# Patient Record
Sex: FEMALE | Race: WHITE | NOT HISPANIC OR LATINO | URBAN - METROPOLITAN AREA
[De-identification: names, ages, dates, MRNs, and addresses within clinical notes are randomized per-mention and may not be internally consistent; named-entity substitution may affect disease eponyms.]

---

## 2017-11-12 ENCOUNTER — EMERGENCY (EMERGENCY)
Facility: HOSPITAL | Age: 27
LOS: 1 days | Discharge: HOME | End: 2017-11-12
Admitting: EMERGENCY MEDICINE
Payer: COMMERCIAL

## 2017-11-12 VITALS
DIASTOLIC BLOOD PRESSURE: 64 MMHG | OXYGEN SATURATION: 100 % | TEMPERATURE: 98 F | RESPIRATION RATE: 18 BRPM | HEART RATE: 100 BPM | SYSTOLIC BLOOD PRESSURE: 98 MMHG

## 2017-11-12 VITALS
RESPIRATION RATE: 18 BRPM | HEART RATE: 90 BPM | OXYGEN SATURATION: 99 % | DIASTOLIC BLOOD PRESSURE: 80 MMHG | SYSTOLIC BLOOD PRESSURE: 125 MMHG | TEMPERATURE: 98 F

## 2017-11-12 PROCEDURE — 70450 CT HEAD/BRAIN W/O DYE: CPT | Mod: 26

## 2017-11-12 PROCEDURE — 99284 EMERGENCY DEPT VISIT MOD MDM: CPT | Mod: 25

## 2017-11-12 RX ORDER — ACETAMINOPHEN 500 MG
975 TABLET ORAL ONCE
Qty: 0 | Refills: 0 | Status: COMPLETED | OUTPATIENT
Start: 2017-11-12 | End: 2017-11-12

## 2017-11-12 RX ADMIN — Medication 975 MILLIGRAM(S): at 05:15
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
## 2017-11-12 NOTE — ED ADULT NURSE NOTE - CAS TRG GEN SKIN CONDITION
Migraña   LO QUE NECESITA SABER:   [place] es un dolor de nicolás intenso  El dolor puede ser tan severo que interfiere con antonieta actividades cotidianas  Condon puede durar desde pocas horas hasta varios días  La causa exacta de la migraña no es conocida  INSTRUCCIONES SOBRE [facility]ITAL[facility]:   Regrese a la bonita de emergencias si:   · Usted tiene dolor de nicolás que parece ser diferente o mucho peor que quinn migraña habitual     · Usted tiene un dolor de nicolás severo con fiebre o rigidez en el tobin  · Usted tiene nuevos problemas con el habla, la visión, el equilibrio o el [address]  · Usted siente que se va a desmayar, se siente confundido o sufre boo convulsión  Comuníquese con quinn médico o neurólogo si:   · Quinn migraña interfiere con antonieta actividades cotidianas  · Antonieta medicamentos o tratamientos asher de funcionar  · Usted tiene preguntas o inquietudes acerca de quinn condición o cuidado  Medicamentos:  Usted podría  necesitar alguno de los siguientes  [place] medicamento tan pronto keaton sienta que le comienza [place]  · Un medicamento con receta para el dolor  podrían ser Kenard Raisa  No espere a que el dolor sea muy intenso para bruce el medicamento  · Medicamentos para la migraña  se [place] para evitar boo migraña o detenerla boo vez que comience  · Los medicamentos contra las náuseas  pueden darse para calmar quinn estómago y ayudarle a prevenir los vómitos  [place] medicamento también puede aliviar el dolor  · [place] antonieta medicamentos keaton se le haya indicado  Consulte con quinn médico si usted parish que quinn medicamento no le está ayudando o si presenta efectos secundarios  Infórmele si es alérgico a cualquier medicamento  Mantenga boo lista actualizada de los [place], las vitaminas y los productos herbales que kelsi  Incluya los siguientes datos de los medicamentos: cantidad, frecuencia y motivo de administración   Traiga con usted la lista o los envases de la píldoras a antonieta citas de seguimiento  Lleve la lista de los medicamentos con usted en reagan de boo emergencia  [place] de quinn síntomas:   · Repose en boo habitación oscura y Arnie  Edson ayudará a disminuir el dolor  El dormir también podría ayudarlo a aliviar quinn dolor  · Aplique hielo para reducir el dolor  Use un paquete de hielo o ponga hielo molido dentro de [employer]  Cubra el paquete de hielo con boo toalla y colóqueselo en la nicolás  Aplique hielo quita 15 a 20 minutos cada hora  · Aplique calor para disminuir el dolor y los espasmos musculares  Utilice boo toalla pequeña empapada con [place], boo almohada térmica o tome un baño de manav con agua tibia  Aplique la compresa caliente sobre el área por 20 a 30 minutos cada 2 horas  Usted puede alternar el calor y el hielo  · Mantenga un registro de las migrañas  Escriba cuándo comienzan y terminan hui migrañas  [place] y [place] haciendo cuando comenzó [place]  Registre lo que comió y lo que tomó las 24 horas antes de que comenzó quinn migraña  Mantenga un registro de lo que hizo para tratar quinn migraña y si funcionó  Traiga el registro de las migrañas con usted a las citas con quinn médico   Programe boo troy con quinn médico o quinn neurólogo keaton se le indique:  Lleve quinn registro de migrañas con usted  Anote hui preguntas para que se acuerde de hacerlas quita hui visitas  Evite otra migraña:   · No fume  La nicotina y otras sustancias químicas en los cigarrillos y puros pueden desencadenar boo Graciella Ek  Pida información a quinn médico si usted actualmente fuma y necesita ayuda para dejar de fumar  Los cigarrillos electrónicos o tabaco sin humo todavía contienen nicotina  Consulte con quinn médico antes de [employer]  · No consuma alcohol  El alcohol puede provocar migraña  También puede impedir que [employer] medicamentos para la migraña  · Ejercítese regularmente    El ejercicio puede ayudar a evitar migrañas  Consulte con camara médico acerca de cuál es el mejor régimen de ejercicio para usted  Trate de hacer unos 30 minutos de ejercicio todos los días que pueda  · Controle el estrés  El estrés podría provocar migraña  Aprenda nuevas maneras de relajarse keaton la respiración profunda  · Establezca un horario para dormir  Acuéstese y levántese a la misma hora cada día  No anh televisión inmediatamente antes de acostarse  · Coma hui comidas regularmente  Incluya alimentos [employer] fruta, verduras, panes de grano entero, productos lácteos bajos en grasa, frijoles, carne magra y pescado  No consuma alimentos o bebidas que puedan desencadenar hui migrañas  © 2017 [address] [employer] Information is for End User's use only and may not be sold, redistributed or otherwise used for commercial purposes  All illustrations and images included in CareNotes® are the copyrighted property of A D A M , Inc  or Dmitriy Swenson  Esta información es sólo para uso en educación  Camara intención no es darle un consejo médico sobre enfermedades o tratamientos  Colsulte con camara [place] farmacéutico antes de seguir cualquier régimen médico para saber si es seguro y efectivo para usted  Warm/Dry
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
## 2017-11-12 NOTE — ED PROVIDER NOTE - MEDICAL DECISION MAKING DETAILS
Tylenol 975mg PO administered in ED. CT Head negative for acute intracranial trauma. Tylenol 975mg PO administered in ED. Pt reports feeling much better. A&Ox3. NAD. Sitting comfortably with no other complaints at this time. Will D/C.
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
## 2017-11-16 DIAGNOSIS — Z88.0 ALLERGY STATUS TO PENICILLIN: ICD-10-CM

## 2017-11-16 DIAGNOSIS — S09.90XA UNSPECIFIED INJURY OF HEAD, INITIAL ENCOUNTER: ICD-10-CM

## 2017-11-16 DIAGNOSIS — S00.03XA CONTUSION OF SCALP, INITIAL ENCOUNTER: ICD-10-CM
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
